# Patient Record
(demographics unavailable — no encounter records)

---

## 2024-10-09 NOTE — DISCUSSION/SUMMARY
[FreeTextEntry1] : Tolerating atorvastatin 20mg qd and CoQ 10 without symptoms. Will increase to 40mg qd.    # HLD # Statin Associated Muscle Symptoms - c/w coq10 - incr atorva to 40mg qd  # Dyspnea on Exertion - ILEANA pending  RTC as scheduled on 11/4

## 2024-10-09 NOTE — HISTORY OF PRESENT ILLNESS
[FreeTextEntry1] : Mr. SANDERSON is a 56 year male presenting for follow-up  Notable PMHx: - BART on CPAP - HLD (untreated ) - Elevated lp(a) - Statin Intolerance - No Family History of Cardiac Disease - Never Smoker  8/5/24 Here for follow up to discuss HLD. Has previously trialed simvastatin 20mg daily in the past for 2 months with leg cramps. Trialed crestor 20mg daily for 1 week with similar side effects. Has never used CoQ10. Breathing improved since starting CPAP. No chest pain.  7/29/24 Ongoing intermittent dyspnea since feb 2024. occurring daily. lasts 15-20 minutes. resolves spontaneously. sometimes associated with exertion. 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 He has a history of statin intolerance though he cannot recall which statins he has tried. He tried simvastatin and another whose name he cannot recall and developed muscle aching with this so discontinued. Exercise: no formal exercise program, though active at work  Social Hx: never smoker, social alcohol use, no recreational drug use, lives at home with wife and 2 kids both healthy  Data Review: EKG - 7/24/24: NSR. Normal EKG Echo - no prior Stress - no prior Cath - no prior

## 2024-11-04 NOTE — PHYSICAL EXAM
[TextEntry] : General: NAD Cardiac: nl s1s2, bradycardia, regular rhythm, no mrg, no JVD, no peripheral edema Lungs: CTAB. Normal respiratory effort Vascular: lower extremities warm/well perfused Neuro: moves all extremities Psych: normal affect, normal mood

## 2024-11-04 NOTE — DISCUSSION/SUMMARY
[EKG obtained to assist in diagnosis and management of assessed problem(s)] : EKG obtained to assist in diagnosis and management of assessed problem(s) [FreeTextEntry1] : Here for follow-up of hyperlipidemia with untreated LDL of 194 and prior statin associated muscle symptoms with Crestor and simvastatin.  Started on low-dose atorvastatin uptitrated to 20 mg daily currently with CoQ10 with improvement in LDL to 156. Exercise stress negative for inducible ischemia and RIOS improved since CPAP initiation though still present. Also with calf claudication. EKG with unusual P wave axis today though no signs or symptoms of christina arrythmias - monitor for now.  # Dyspnea on Exertion - TTE  # HLD - lipid panel checked - incr atorva to 40mg qd  # Calf Claudication - check UMU  RTC for telehealth visit in 1 month Note addended following lipid panel results. Clinic RN to call patient to inform him to increase atorva dose. New Rx sent to pharmacy.

## 2024-11-04 NOTE — HISTORY OF PRESENT ILLNESS
[FreeTextEntry1] : Mr. SANDERSON is a 57 year male presenting for follow-up  Notable PMHx: - BART on CPAP - HLD (untreated ) - Statin Associated Muscle Sxs (w/ rosuvastatin, simvastatin; tolerating atorva) - No Family History of Cardiac Disease - Never Smoker  HPI: Since last visit, started on atorva 10 qMWF with CoQ10 uptitrated to daily and then 20mg qd. Sent for exercise stress EKG for dyspnea on exertion which has significantly improved since starting CPAP for BART. Stress negative for ischemia. Did 8.2 METs with duke treadmill score of 7. Had some leg fatigue during stress and balance issues but no chest pain/dyspnea. Reports that he will develop leg fatigue with walking at times. Dyspnea on exertion mostly resolved, last noticed 2 weeks ago while working that lasted 1 hour. No chest pain. 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 Does not break a sweat. Denies chest pain, light-headedness, dizziness, syncope.  8/5/24 Here for follow up to discuss HLD. Has previously trialed simvastatin 20mg daily in the past for 2 months with leg cramps. Trialed crestor 20mg daily for 1 week with similar side effects. Has never used CoQ10. Breathing improved since starting CPAP. No chest pain.  7/29/24 Ongoing intermittent dyspnea since feb 2024. occurring daily. lasts 15-20 minutes. resolves spontaneously. sometimes associated with exertion. 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 He has a history of statin intolerance though he cannot recall which statins he has tried. He tried simvastatin and another whose name he cannot recall and developed muscle aching with this so discontinued. 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 IVCD. - 7/24/24: NSR. Normal EKG Echo - no prior Stress -10/21/24 Exercise EKG: Negative for ischemia. 8.2 mets. duke score +7. stopped due to leg fatigue and balance issues.  Cath - no prior

## 2024-11-25 NOTE — REVIEW OF SYSTEMS
[Chest Discomfort] : chest discomfort [Hay Fever] : hay fever [Sinus Problems] : sinus problems [Fatigue] : no fatigue [Poor Appetite] : no poor appetite [Cough] : no cough [Chest Tightness] : no chest tightness [Wheezing] : no wheezing [GERD] : no gerd [Myalgias] : no myalgias [Rash] : no rash [History of Iron Deficiency] : no history of iron deficiency [Headache] : no headache [Anxiety] : no anxiety [Diabetes] : no diabetes [Thyroid Problem] : no thyroid problem

## 2024-11-25 NOTE — PHYSICAL EXAM
[No Acute Distress] : no acute distress [No Neck Mass] : no neck mass [Normal Rate/Rhythm] : normal rate/rhythm [Normal S1, S2] : normal s1, s2 [No Resp Distress] : no resp distress [Clear to Auscultation Bilaterally] : clear to auscultation bilaterally [Benign] : benign [Normal Gait] : normal gait [No Clubbing] : no clubbing [No Cyanosis] : no cyanosis [No Edema] : no edema [Normal Turgor] : normal turgor [No Focal Deficits] : no focal deficits [Normal Affect] : normal affect [Normal Appearance] : normal appearance

## 2024-11-25 NOTE — DISCUSSION/SUMMARY
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

## 2024-11-25 NOTE — HISTORY OF PRESENT ILLNESS
[Never] : never [Obstructive Sleep Apnea] : obstructive sleep apnea [TextBox_4] : He was started on auto CPAP a few months ago.  He has a nasal mask.  For the most part he is doing well with that however at times it comes off and it wakes him up.  He is feeling better.  He is less sleepy.  He is still having nasal congestion. [Awakes with Headache] : does not awaken with headache [Nonrestorative Sleep] : denies nonrestorative sleep

## 2024-11-25 NOTE — PROCEDURE
[FreeTextEntry1] : Chest x-ray 4/12/24: No infiltrates.  Elevation of the right hemidiaphragm.  PFT 5/1/24: No obstruction.  Mild restriction.  Diffusion was normal.  No significant change after bronchodilator was noted.  HSAT 5/9/24: Consistent with moderate BART.

## 2024-12-04 NOTE — HISTORY OF PRESENT ILLNESS
[FreeTextEntry1] : Mr. SANDERSON is a 57 year male presenting for follow-up  Notable PMHx: - BART on CPAP - HLD (untreated ) - Statin Associated Muscle Sxs (w/ rosuvastatin, simvastatin; tolerating atorva) - No Family History of Cardiac Disease - Never Smoker  HPI: Since last visit, toelratin atorva incr to 40mg qd*** *** UMU and TTE results *** repeat EKG today   11/4/24 started on atorva 10 qMWF with CoQ10 uptitrated to daily and then 20mg qd. Sent for exercise stress EKG for dyspnea on exertion which has significantly improved since starting CPAP for BART. Stress negative for ischemia. Did 8.2 METs with duke treadmill score of 7. Had some leg fatigue during stress and balance issues but no chest pain/dyspnea. Reports that he will develop leg fatigue with walking at times. Dyspnea on exertion mostly resolved, last noticed 2 weeks ago while working that lasted 1 hour. No chest pain. 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 Does not break a sweat. Denies chest pain, light-headedness, dizziness, syncope.  8/5/24 Here for follow up to discuss HLD. Has previously trialed simvastatin 20mg daily in the past for 2 months with leg cramps. Trialed crestor 20mg daily for 1 week with similar side effects. Has never used CoQ10. Breathing improved since starting CPAP. No chest pain.  7/29/24 Ongoing intermittent dyspnea since feb 2024. occurring daily. lasts 15-20 minutes. resolves spontaneously. sometimes associated with exertion. 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 He has a history of statin intolerance though he cannot recall which statins he has tried. He tried simvastatin and another whose name he cannot recall and developed muscle aching with this so discontinued. 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 IVCD. - 7/24/24: NSR. Normal EKG Echo - no prior Stress -10/21/24 Exercise EKG: Negative for ischemia. 8.2 mets. duke score +7. stopped due to leg fatigue and balance issues.  Cath - no prior  =====  Here for follow-up of hyperlipidemia with untreated LDL of 194 and prior statin associated muscle symptoms with Crestor and simvastatin. Started on low-dose atorvastatin uptitrated to 20 mg daily currently with CoQ10 with improvement in LDL to 156. Exercise stress negative for inducible ischemia and RIOS improved since CPAP initiation though still present. Also with calf claudication. EKG with unusual P wave axis today though no signs or symptoms of christina arrythmias - monitor for now.  # Dyspnea on Exertion - TTE results  # HLD ***- incr atorva to 80mg qd ***- if tolerating in 4 weeks, recommend adding zetia 10mg qd ***f/u in 3 months with fasting lipid panel prior  # Calf Claudication ***- umu results   RTC for ***

## 2024-12-04 NOTE — HISTORY OF PRESENT ILLNESS
[FreeTextEntry1] : Mr. SANDERSON is a 57 year male presenting for follow-up  Notable PMHx: - BART on CPAP - HLD (untreated ) - Statin Associated Muscle Sxs (w/ rosuvastatin, simvastatin; tolerating atorva) - No Family History of Cardiac Disease - Never Smoker  HPI: Since last visit, toelratin atorva incr to 40mg qd*** *** UMU and TTE results *** repeat EKG today   11/4/24 started on atorva 10 qMWF with CoQ10 uptitrated to daily and then 20mg qd. Sent for exercise stress EKG for dyspnea on exertion which has significantly improved since starting CPAP for BART. Stress negative for ischemia. Did 8.2 METs with duke treadmill score of 7. Had some leg fatigue during stress and balance issues but no chest pain/dyspnea. Reports that he will develop leg fatigue with walking at times. Dyspnea on exertion mostly resolved, last noticed 2 weeks ago while working that lasted 1 hour. No chest pain. 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 Does not break a sweat. Denies chest pain, light-headedness, dizziness, syncope.  8/5/24 Here for follow up to discuss HLD. Has previously trialed simvastatin 20mg daily in the past for 2 months with leg cramps. Trialed crestor 20mg daily for 1 week with similar side effects. Has never used CoQ10. Breathing improved since starting CPAP. No chest pain.  7/29/24 Ongoing intermittent dyspnea since feb 2024. occurring daily. lasts 15-20 minutes. resolves spontaneously. sometimes associated with exertion. 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 He has a history of statin intolerance though he cannot recall which statins he has tried. He tried simvastatin and another whose name he cannot recall and developed muscle aching with this so discontinued. 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 IVCD. - 7/24/24: NSR. Normal EKG Echo - no prior Stress -10/21/24 Exercise EKG: Negative for ischemia. 8.2 mets. duke score +7. stopped due to leg fatigue and balance issues.  Cath - no prior  =====  Here for follow-up of hyperlipidemia with untreated LDL of 194 and prior statin associated muscle symptoms with Crestor and simvastatin. Started on low-dose atorvastatin uptitrated to 20 mg daily currently with CoQ10 with improvement in LDL to 156. Exercise stress negative for inducible ischemia and RIOS improved since CPAP initiation though still present. Also with calf claudication. EKG with unusual P wave axis today though no signs or symptoms of christina arrythmias - monitor for now.  # Dyspnea on Exertion - TTE results  # HLD ***- incr atorva to 80mg qd ***- if tolerating in 4 weeks, recommend adding zetia 10mg qd ***f/u in 3 months with fasting lipid panel prior  # Calf Claudication ***- umu results   RTC for ***

## 2024-12-30 NOTE — HISTORY OF PRESENT ILLNESS
[FreeTextEntry1] : Mr. SANDERSON is a 57 year male presenting for follow-up  Notable PMHx: - BART on CPAP - HLD (untreated ) - Statin Associated Muscle Sxs (w/ rosuvastatin, simvastatin) - No Family History of Cardiac Disease - Never Smoker  HPI: Since last visit, atorva increased to 40mg qd maintained on CoQ 10. No recurrence of muscle aches. Purchased a treadmill and doing treadmill x 30m at a flat incline and power walking; unsure of total distance. No leg pain on treadmill. No other sxs on treadmill.   12/30/24 started on atorva 10 qMWF with CoQ10 uptitrated to daily and then 20mg qd. Sent for exercise stress EKG for dyspnea on exertion which has significantly improved since starting CPAP for BART. Stress negative for ischemia. Did 8.2 METs with duke treadmill score of 7. Had some leg fatigue during stress and balance issues but no chest pain/dyspnea. Reports that he will develop leg fatigue with walking at times. Dyspnea on exertion mostly resolved, last noticed 2 weeks ago while working that lasted 1 hour. No chest pain. 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 Does not break a sweat. Denies chest pain, light-headedness, dizziness, syncope.  8/5/24 Here for follow up to discuss HLD. Has previously trialed simvastatin 20mg daily in the past for 2 months with leg cramps. Trialed crestor 20mg daily for 1 week with similar side effects. Has never used CoQ10. Breathing improved since starting CPAP. No chest pain.  7/29/24 Ongoing intermittent dyspnea since feb 2024. occurring daily. lasts 15-20 minutes. resolves spontaneously. sometimes associated with exertion. 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 He has a history of statin intolerance though he cannot recall which statins he has tried. He tried simvastatin and another whose name he cannot recall and developed muscle aching with this so discontinued. 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 IVCD. - 7/24/24: NSR. Normal EKG Echo - 12/18/24 TTE: LVEF 65%, mild LVH, no rwma, Normal RV, no sig valvular disease, normal PAS, no pericardial effusion Stress - 10/21/24 Exercise EKG: Negative for ischemia. 8.2 mets. duke score +7. stopped due to leg fatigue and balance issues.  Cath - no prior Other - 12/20/24 resting UMU: normal

## 2024-12-30 NOTE — DISCUSSION/SUMMARY
[FreeTextEntry1] : Here for follow-up of hyperlipidemia with untreated LDL of 194 and prior statin associated muscle symptoms. Continue to uptitrate statin  # HLD # History of MANUELA - c/w CoQ10 100mg qd - increase atorvastatin to 80mg qd  - repeat lipid panel at next visit  RTC for in 3 months or sooner PRN

## 2024-12-30 NOTE — HISTORY OF PRESENT ILLNESS
[FreeTextEntry1] : Mr. SANDERSON is a 57 year male presenting for follow-up  Notable PMHx: - BART on CPAP - HLD (untreated ) - Statin Associated Muscle Sxs (w/ rosuvastatin, simvastatin) - No Family History of Cardiac Disease - Never Smoker  HPI: Since last visit, atorva increased to 40mg qd maintained on CoQ 10. No recurrence of muscle aches. Purchased a treadmill and doing treadmill x 30m at a flat incline and power walking; unsure of total distance. No leg pain on treadmill. No other sxs on treadmill.   12/30/24 started on atorva 10 qMWF with CoQ10 uptitrated to daily and then 20mg qd. Sent for exercise stress EKG for dyspnea on exertion which has significantly improved since starting CPAP for BART. Stress negative for ischemia. Did 8.2 METs with duke treadmill score of 7. Had some leg fatigue during stress and balance issues but no chest pain/dyspnea. Reports that he will develop leg fatigue with walking at times. Dyspnea on exertion mostly resolved, last noticed 2 weeks ago while working that lasted 1 hour. No chest pain. 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 Does not break a sweat. Denies chest pain, light-headedness, dizziness, syncope.  8/5/24 Here for follow up to discuss HLD. Has previously trialed simvastatin 20mg daily in the past for 2 months with leg cramps. Trialed crestor 20mg daily for 1 week with similar side effects. Has never used CoQ10. Breathing improved since starting CPAP. No chest pain.  7/29/24 Ongoing intermittent dyspnea since feb 2024. occurring daily. lasts 15-20 minutes. resolves spontaneously. sometimes associated with exertion. 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 He has a history of statin intolerance though he cannot recall which statins he has tried. He tried simvastatin and another whose name he cannot recall and developed muscle aching with this so discontinued. 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 IVCD. - 7/24/24: NSR. Normal EKG Echo - 12/18/24 TTE: LVEF 65%, mild LVH, no rwma, Normal RV, no sig valvular disease, normal PAS, no pericardial effusion Stress - 10/21/24 Exercise EKG: Negative for ischemia. 8.2 mets. duke score +7. stopped due to leg fatigue and balance issues.  Cath - no prior Other - 12/20/24 resting UMU: normal

## 2024-12-30 NOTE — HISTORY OF PRESENT ILLNESS
[FreeTextEntry1] : Mr. SANDERSON is a 57 year male presenting for follow-up  Notable PMHx: - BART on CPAP - HLD (untreated ) - Statin Associated Muscle Sxs (w/ rosuvastatin, simvastatin) - No Family History of Cardiac Disease - Never Smoker  HPI: Since last visit, atorva increased to 40mg qd maintained on CoQ 10. No recurrence of muscle aches. Purchased a treadmill and doing treadmill x 30m at a flat incline and power walking; unsure of total distance. No leg pain on treadmill. No other sxs on treadmill.   12/30/24 started on atorva 10 qMWF with CoQ10 uptitrated to daily and then 20mg qd. Sent for exercise stress EKG for dyspnea on exertion which has significantly improved since starting CPAP for BART. Stress negative for ischemia. Did 8.2 METs with duke treadmill score of 7. Had some leg fatigue during stress and balance issues but no chest pain/dyspnea. Reports that he will develop leg fatigue with walking at times. Dyspnea on exertion mostly resolved, last noticed 2 weeks ago while working that lasted 1 hour. No chest pain. 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 Does not break a sweat. Denies chest pain, light-headedness, dizziness, syncope.  8/5/24 Here for follow up to discuss HLD. Has previously trialed simvastatin 20mg daily in the past for 2 months with leg cramps. Trialed crestor 20mg daily for 1 week with similar side effects. Has never used CoQ10. Breathing improved since starting CPAP. No chest pain.  7/29/24 Ongoing intermittent dyspnea since feb 2024. occurring daily. lasts 15-20 minutes. resolves spontaneously. sometimes associated with exertion. 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 He has a history of statin intolerance though he cannot recall which statins he has tried. He tried simvastatin and another whose name he cannot recall and developed muscle aching with this so discontinued. 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 IVCD. - 7/24/24: NSR. Normal EKG Echo - 12/18/24 TTE: LVEF 65%, mild LVH, no rwma, Normal RV, no sig valvular disease, normal PAS, no pericardial effusion Stress - 10/21/24 Exercise EKG: Negative for ischemia. 8.2 mets. duke score +7. stopped due to leg fatigue and balance issues.  Cath - no prior Other - 12/20/24 resting UMU: normal

## 2025-07-24 NOTE — DISCUSSION/SUMMARY
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   PFT performed to assist in clinical decision making.

## 2025-07-24 NOTE — PHYSICAL EXAM
[No Acute Distress] : no acute distress [Normal Appearance] : normal appearance [Normal Rate/Rhythm] : normal rate/rhythm [Normal S1, S2] : normal s1, s2 [No Resp Distress] : no resp distress [Clear to Auscultation Bilaterally] : clear to auscultation bilaterally [Benign] : benign [Normal Gait] : normal gait [No Clubbing] : no clubbing [No Cyanosis] : no cyanosis [No Edema] : no edema [Normal Turgor] : normal turgor [No Focal Deficits] : no focal deficits [Normal Affect] : normal affect [Normal Oropharynx] : normal oropharynx [No Abnormalities] : no abnormalities

## 2025-07-24 NOTE — REVIEW OF SYSTEMS
[Sinus Problems] : sinus problems [Chest Discomfort] : chest discomfort [Hay Fever] : hay fever [Fatigue] : no fatigue [Nasal Congestion] : nasal congestion [Postnasal Drip] : postnasal drip [Cough] : cough [Chest Tightness] : no chest tightness [Wheezing] : no wheezing [employer] Interventions [GERD] : no gerd [Myalgias] : no myalgias [Rash] : no rash [History of Iron Deficiency] : no history of iron deficiency [Headache] : no headache [Anxiety] : no anxiety [Diabetes] : no diabetes [Thyroid Problem] : no thyroid problem

## 2025-07-24 NOTE — HISTORY OF PRESENT ILLNESS
[Obstructive Sleep Apnea] : obstructive sleep apnea [Never] : never [TextBox_4] : 11/25/2024: He was started on auto CPAP a few months ago.  He has a nasal mask.  For the most part he is doing well with that however at times it comes off and it wakes him up.  He is feeling better.  He is less sleepy.  He is still having nasal congestion.  7/24/2025: He has been using CPAP every night.  He has a nasal pillow mask and is not having any difficulty with it.  Continues to complain of nasal congestion.  He has not been seen by ENT as previously advised.  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  At times he feels short of breath with this.  He uses albuterol as needed however it does not always provide him with relief.  He does not smoke.  He never smoked. [Awakes with Headache] : does not awaken with headache [Nonrestorative Sleep] : denies nonrestorative sleep